# Patient Record
Sex: FEMALE | Race: WHITE
[De-identification: names, ages, dates, MRNs, and addresses within clinical notes are randomized per-mention and may not be internally consistent; named-entity substitution may affect disease eponyms.]

---

## 2019-12-24 ENCOUNTER — HOSPITAL ENCOUNTER (EMERGENCY)
Dept: HOSPITAL 25 - UCEAST | Age: 39
Discharge: HOME | End: 2019-12-24
Payer: COMMERCIAL

## 2019-12-24 VITALS — DIASTOLIC BLOOD PRESSURE: 75 MMHG | SYSTOLIC BLOOD PRESSURE: 133 MMHG

## 2019-12-24 DIAGNOSIS — L25.9: Primary | ICD-10-CM

## 2019-12-24 PROCEDURE — 99212 OFFICE O/P EST SF 10 MIN: CPT

## 2019-12-24 PROCEDURE — G0463 HOSPITAL OUTPT CLINIC VISIT: HCPCS

## 2019-12-24 NOTE — UC
Skin Complaint HPI





- HPI Summary


HPI Summary: 





39-year-old female who noticed a rash to her left antecubital area.  She states 

that she gave blood on Saturday, 4 days ago and had a cotton ball with a Band-

Aid on however she's never had an allergy to Band-Aids.  She was in the shower 

today when she noticed a rash.  She states that it is itchy.





- History of Current Complaint


Chief Complaint: UCSkin


Time Seen by Provider: 12/24/19 15:18


Stated Complaint: RASH


Hx Obtained From: Patient


Hx Last Menstrual Period: [id number]


Pregnant?: No


Onset/Duration: Gradual Onset


Skin Exposure Onset/Duration: Hours Ago


Timing: Constant


Onset Severity: Mild


Current Severity: Mild


Pain Intensity: 4


Location: Other - Left arm in the left antecubital area.


Character: Pruritus, Redness - The rash itself is red but the surrounding skin 

is not swollen or red similar to a cellulitis.


Aggravating Factor(s): Nothing


Alleviating Factor(s): Other - Patient has not tried any treatments, she just 

came here.


Associated Signs & Symptoms: Positive: Negative





- Allergy/Home Medications


Allergies/Adverse Reactions: 


 Allergies











Allergy/AdvReac Type Severity Reaction Status Date / Time


 


No Known Allergies Allergy   Verified 08/28/19 10:01














PMH/Surg Hx/FS Hx/Imm Hx


Previously Healthy: Yes





- Surgical History


Surgical History: Yes


Surgery Procedure, Year, and Place: [facility] [place]19





- Family History


Known Family History: Positive: Cardiac Disease, Other - Colon cancer, cyst, 

hypercholesterolemia 


   Negative: Hypertension, Diabetes





- Social History


Alcohol Use: Occasionally


Substance Use Type: None


Smoking Status (MU): Never Smoked Tobacco





Review of Systems


All Other Systems Reviewed And Are Negative: Yes


Skin: Positive: Rash - Red Rash left antecubital area with some itching.


Is Patient Immunocompromised?: No





Physical Exam


Triage Information Reviewed: Yes


Appearance: Well-Appearing, No Pain Distress, Well-Nourished


Vital Signs: 


 Initial Vital Signs











Temp  99.0 F   12/24/19 15:16


 


Pulse  98   12/24/19 15:16


 


Resp  18   12/24/19 15:16


 


BP  133/75   12/24/19 15:16


 


Pulse Ox  98   12/24/19 15:16











Vital Signs Reviewed: Yes


Musculoskeletal Exam: Normal


Musculoskeletal: Positive: Other: - Full range of motion, good peripheral 

pulses neuro sensation and capillary refill.  Good radial and ulnar pulses.


Neurological Exam: Normal


Psychological Exam: Normal


Skin: Positive: Rashes - Patient has a red rash to the left antecubital area 

which does not appear to be cellulitis.  It appears more to be a contact 

dermatitis.  Many of the areas are pinpoint red areas but not petechial.  No 

axillary or antecubital lymph node enlargement.  No Streaking





Course/Dx





- Course


Course Of Treatment: 





Patient is comfortable here.  She has not tried any medications swollen good 

have her try hydrocortisone cream topically twice a day for the next 24 hours 

and then if no improvement she is to stop that and start the Medrol Dosepak.  

She can also take Benadryl as directed.





- Diagnoses


Provider Diagnosis: 


 Contact dermatitis








Discharge ED





- Sign-Out/Discharge


Documenting (check all that apply): Patient Departure


All imaging exams completed and their final reports reviewed: No Studies





- Discharge Plan


Condition: Good


Disposition: HOME


Prescriptions: 


methylPREDNISolone [Medrol Dosepak 4 MG*] 0 mg PO .SEE ANIKA INSTRUCTION #1 tab


Patient Education Materials:  Contact Dermatitis (DC)


Referrals: 


Montserrat Escobar NP [Primary Care Provider] - 


Additional Instructions: 


Avoid scratching the area.  May take Benadryl as directed every 6 hours for 

itching.  May apply over-the-counter hydrocortisone cream twice a day to the 

area.  If no improvement stop the hydrocortisone cream and start the Medrol 

Dosepak.  If the area becomes increasingly hot, red, increased tenderness, red 

streaks up your arm get rechecked by your primary care provider.





- Billing Disposition and Condition


Condition: GOOD


Disposition: Home